# Patient Record
Sex: FEMALE | Race: WHITE | Employment: PART TIME | URBAN - METROPOLITAN AREA
[De-identification: names, ages, dates, MRNs, and addresses within clinical notes are randomized per-mention and may not be internally consistent; named-entity substitution may affect disease eponyms.]

---

## 2017-11-10 ENCOUNTER — OFFICE VISIT (OUTPATIENT)
Dept: INTERNAL MEDICINE CLINIC | Age: 22
End: 2017-11-10

## 2017-11-10 VITALS
WEIGHT: 140 LBS | DIASTOLIC BLOOD PRESSURE: 64 MMHG | HEART RATE: 67 BPM | HEIGHT: 64 IN | SYSTOLIC BLOOD PRESSURE: 107 MMHG | BODY MASS INDEX: 23.9 KG/M2 | RESPIRATION RATE: 16 BRPM

## 2017-11-10 DIAGNOSIS — F41.9 ANXIETY: ICD-10-CM

## 2017-11-10 DIAGNOSIS — F41.9 ANXIETY: Primary | ICD-10-CM

## 2017-11-10 DIAGNOSIS — Z11.3 ROUTINE SCREENING FOR STI (SEXUALLY TRANSMITTED INFECTION): ICD-10-CM

## 2017-11-10 DIAGNOSIS — F33.0 MILD EPISODE OF RECURRENT MAJOR DEPRESSIVE DISORDER (HCC): ICD-10-CM

## 2017-11-10 DIAGNOSIS — F41.0 PANIC DISORDER WITHOUT AGORAPHOBIA: ICD-10-CM

## 2017-11-10 DIAGNOSIS — F32.9 REACTIVE DEPRESSION: ICD-10-CM

## 2017-11-10 PROCEDURE — 99213 OFFICE O/P EST LOW 20 MIN: CPT | Performed by: INTERNAL MEDICINE

## 2017-11-10 ASSESSMENT — PATIENT HEALTH QUESTIONNAIRE - PHQ9
SUM OF ALL RESPONSES TO PHQ QUESTIONS 1-9: 0
1. LITTLE INTEREST OR PLEASURE IN DOING THINGS: 0
SUM OF ALL RESPONSES TO PHQ9 QUESTIONS 1 & 2: 0
2. FEELING DOWN, DEPRESSED OR HOPELESS: 0

## 2017-11-10 NOTE — PROGRESS NOTES
Chief Complaint   Patient presents with    Check-Up     med. check    Health Maintenance     [facility] for pap. HPI: Here to followup on anxiety and depression. 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 ROS (1+): no suicidal ideation    Also sees gyn, had visit today but no Chlamydia testing done that she knows of        Medications reviewed and reconciled with what patient reports to be taking. /64   Pulse 67   Resp 16   Ht 5' 4\" (1.626 m)   Wt 140 lb (63.5 kg)   LMP 11/05/2017   BMI 24.03 kg/m²     Physical Exam GENERAL: alert, oriented x4, well-appearing in NAD      Vitals reviewed from intake /64   Pulse 67   Resp 16   Ht 5' 4\" (1.626 m)   Wt 140 lb (63.5 kg)   LMP 11/05/2017   BMI 24.03 kg/m²     HEENT: normocephalic atraumatic clear conj/nares/op     NECK: supple without lymphadenopathy or thyromegaly, no bruit    COR: RRR no murmurs rubs or gallops    LUNGS: clear to auscultation with normal work of breathing    ABDOMEN: soft, nontender, normal bowel sounds, no masses or organomegaly noted    EXTREMITIES: warm, dry, well-perfused, no edema    DERM: no suspicious lesions, no rashes    NEURO: cranial nerves intact, normal speech and gait    SPINE: straight, supple, nontender without swelling        ASSESSMENT/PLAN: Pt received counseling and, if relevant, printed instructions for all symptoms listed in CC and HPI, as well as for all diagnoses listed below. 1. Anxiety--well controlled to continue zoloft    2. Mild episode of recurrent major depressive disorder ([facility] [address]    3.  Panic disorder without agoraphobia--no recent severe attacks, improved with med      Problem List Items Addressed This Visit     Panic disorder without agoraphobia    Anxiety - Primary    Relevant Medications    sertraline (ZOLOFT) 50 MG tablet    Mild episode of recurrent major depressive disorder (HCC)    Relevant Medications    sertraline (ZOLOFT) 50 MG tablet      Other Visit Diagnoses     Reactive depression        Relevant Medications    sertraline (ZOLOFT) 50 MG tablet    Routine screening for STI (sexually transmitted infection)        Relevant Orders    HIV-1 AND HIV-2 ANTIBODIES    CHLAMYDIA ANTIBODIES IGG            Return in about 1 year (around 11/10/2018) for med followup.

## 2018-11-11 DIAGNOSIS — F32.9 REACTIVE DEPRESSION: ICD-10-CM

## 2018-11-11 DIAGNOSIS — F41.9 ANXIETY: ICD-10-CM
